# Patient Record
Sex: MALE | URBAN - METROPOLITAN AREA
[De-identification: names, ages, dates, MRNs, and addresses within clinical notes are randomized per-mention and may not be internally consistent; named-entity substitution may affect disease eponyms.]

---

## 2023-02-17 ENCOUNTER — OFFICE VISIT (OUTPATIENT)
Dept: URBAN - METROPOLITAN AREA CLINIC 56 | Facility: LOCATION | Age: 33
End: 2023-02-17
Payer: COMMERCIAL

## 2023-02-17 DIAGNOSIS — H18.009 CORNEAL DEPOSIT: Primary | ICD-10-CM

## 2023-02-17 PROCEDURE — 92004 COMPRE OPH EXAM NEW PT 1/>: CPT | Performed by: STUDENT IN AN ORGANIZED HEALTH CARE EDUCATION/TRAINING PROGRAM

## 2023-02-17 ASSESSMENT — INTRAOCULAR PRESSURE
OD: 17
OS: 17

## 2023-02-17 ASSESSMENT — KERATOMETRY
OD: 42.22
OS: 42.32

## 2023-02-17 ASSESSMENT — VISUAL ACUITY
OS: 20/20
OD: 20/20

## 2023-02-17 NOTE — IMPRESSION/PLAN
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 Plan: pt diagnosed with Sal's Disease. No Lynch-Fleyscher ring or sunflower cataract. Monitor. Cont care with outside providers as directed.